# Patient Record
Sex: FEMALE | Race: BLACK OR AFRICAN AMERICAN
[De-identification: names, ages, dates, MRNs, and addresses within clinical notes are randomized per-mention and may not be internally consistent; named-entity substitution may affect disease eponyms.]

---

## 2018-03-22 ENCOUNTER — HOSPITAL ENCOUNTER (OUTPATIENT)
Dept: HOSPITAL 5 - OR | Age: 63
Discharge: HOME | End: 2018-03-22
Attending: OPHTHALMOLOGY
Payer: COMMERCIAL

## 2018-03-22 VITALS — DIASTOLIC BLOOD PRESSURE: 76 MMHG | SYSTOLIC BLOOD PRESSURE: 130 MMHG

## 2018-03-22 DIAGNOSIS — H18.51: ICD-10-CM

## 2018-03-22 DIAGNOSIS — H59.011: Primary | ICD-10-CM

## 2018-03-22 DIAGNOSIS — H26.9: ICD-10-CM

## 2018-03-22 PROCEDURE — V2632 POST CHMBR INTRAOCULAR LENS: HCPCS

## 2018-03-22 PROCEDURE — V2785 CORNEAL TISSUE PROCESSING: HCPCS

## 2018-03-22 PROCEDURE — 87075 CULTR BACTERIA EXCEPT BLOOD: CPT

## 2018-03-22 PROCEDURE — 87116 MYCOBACTERIA CULTURE: CPT

## 2018-03-22 PROCEDURE — 66982 XCAPSL CTRC RMVL CPLX WO ECP: CPT

## 2018-03-22 PROCEDURE — 65730 CORNEAL TRANSPLANT: CPT

## 2018-03-22 RX ADMIN — TROPICAMIDE SCH DROPS: 10 SOLUTION/ DROPS OPHTHALMIC at 11:27

## 2018-03-22 RX ADMIN — MOXIFLOXACIN SCH DROPS: 5 SOLUTION/ DROPS OPHTHALMIC at 11:27

## 2018-03-22 RX ADMIN — MOXIFLOXACIN SCH DROPS: 5 SOLUTION/ DROPS OPHTHALMIC at 11:09

## 2018-03-22 RX ADMIN — TROPICAMIDE SCH DROPS: 10 SOLUTION/ DROPS OPHTHALMIC at 11:14

## 2018-03-22 RX ADMIN — PHENYLEPHRINE HYDROCHLORIDE SCH DROPS: 25 SOLUTION/ DROPS OPHTHALMIC at 11:27

## 2018-03-22 RX ADMIN — PHENYLEPHRINE HYDROCHLORIDE SCH DROPS: 25 SOLUTION/ DROPS OPHTHALMIC at 11:14

## 2018-03-22 RX ADMIN — PHENYLEPHRINE HYDROCHLORIDE SCH DROPS: 25 SOLUTION/ DROPS OPHTHALMIC at 11:09

## 2018-03-22 RX ADMIN — MOXIFLOXACIN SCH DROPS: 5 SOLUTION/ DROPS OPHTHALMIC at 11:14

## 2018-03-22 RX ADMIN — TROPICAMIDE SCH DROPS: 10 SOLUTION/ DROPS OPHTHALMIC at 11:09

## 2018-03-22 NOTE — ANESTHESIA CONSULTATION
Anesthesia Consult and Med Hx


Date of service: 03/22/18





- Airway


Anesthetic Teeth Evaluation: Poor


ROM Head & Neck: Adequate


Mental/Hyoid Distance: Adequate


Mallampati Class: Class II


Intubation Access Assessment: Probably Good





- Pulmonary Exam


CTA: Yes





- Cardiac Exam


Cardiac Exam: RRR





- Pre-Operative Health Status


ASA Pre-Surgery Classification: ASA2


Proposed Anesthetic Plan: General, MAC





- Central Nervous System


Hx Psychiatric Problems: No





- Other Systems


Hx Alcohol Use: No


Hx Substance Use: No


Hx Cancer: No

## 2018-03-22 NOTE — OPERATIVE REPORT
Operative Report


Operative Report: 





   














PATIENT'S NAME:   





DATE OF BIRTH:   





DATE OF SURGERY: 03/22/2018   





PREOPERATIVE DIAGNOSIS:   1-Fuchs dystrophy right eye 2- Cataract right eye  3-

bullous keratopathy right eye





   


POSTOPERATIVE DIAGNOSIS:    Same 





OPERATIVE PROCEDURE:   1-superficial keratectomy right eye 2- 

Phacoemulsification with intraocular lens implantation, right eye 3-posterior 

endothelial dystrophy right eye





SURGEON:   Radha Chisholm M.D.





ASSISTANT SURGEON: None   





Lens:  AO60 24.0 D


 


ANESTHESIA:   Monitored anesthesia care in combination with topical and 

intracameral anesthesia because of the established specific risk of reflux, 

arrhythmias, or anxiety attacks associated with ocular manipulation, as well as 

the difficulty of the ophthalmologist to manage such potentially catastrophic 

events while simultaneously attempting to complete the surgical procedure and 

was deemed necessary for the patient's safety to have an Anesthetist present 

during the procedure whenever possible. An Anesthetist was utilized to regulate 

the intravenous sedation of the patient so the patient was cooperative yet not 

asleep in order for the patient to successfully maintain fixation of the eye on 

the operating light of the microscope.





COMPLICATIONS:     No blood loss.





ALLERGIES:   [No known drug allergies]





PROGNOSIS:  Excellent





INDICATIONS FOR SURGERY:  The patient is undergoing surgery in the hopes of 

eliminating or improving these visual difficulties.





PROCEDURE:    After arriving at the surgery center, the patient was given 

topical anesthetic and dilating drops, as noted in the record. The patient was 

then taken into the operating room and given more anesthetic drops.  The eyelids

, lashes, and lid margins were scrubbed with Betadine solution, and the patient 

was draped.  The Nurse Anesthetist administered IV sedation and monitored the 

patient during the procedure.





The corneal surface was found to be very edematous hazy.  The swollen 

epithelial cells were removed using a Weck-Zara.  Used to the anterior chamber 

cleared after removal of the epithelial cells but a sub Prasda's membrane haze 

remained. 


The eye was then fixated with a 0.12, and a stab incision was made in the 

peripheral clear cornea into the anterior chamber. This was made on my left 

side.  Because of the poor view trypan blue was injected .  Viscoelastic was 

next used to fill the anterior chamber.  The eye was once again fixated with 

the 0.12 forceps and a keratome was used make an incision in clear cornea 

peripherally on my right hand side temporally.  





The capsule forceps were used to open the central anterior capsule and then 

make a continuous round capsulotomy.


Hydrodissection was carried out utilizing a cannula and balanced salt solution 

to delineate the cortical material from the capsule and the nucleus from the 

cortical material.


The phaco tip was introduced into the eye and used to remove the anterior 

cortical material in the area of the capsulotomy.  Then the phaco tip was 

buried into the nucleus, and a chopping instrument was introduced into the eye 

and used to provide countertraction in the nucleus between this instrument and 

the phaco tip fracturing the nucleus.  This procedure was repeated multiple 

times, providing multiple small segments of the lens, and then the phaco tip 

was used to remove each of these segments.


An I/A tip was then used to remove the remaining cortex.  


The anterior chamber was refilled with viscoelastic.  An one-piece, acrylic 

intraocular lens was then placed into an inserting cartridge.  The tip of the 

inserting cartridge was introduced into the keratome incision and into the 

anterior chamber.  The implant was gently advanced through the cartridge and 

into the eye, where it unfolded, and both haptics were placed in the capsular 

bag, where it centered nicely and appeared to be well fixated.  


 





The patient has decompensation of the cornea with dysfunction and dystrophy of 

the corneal endothelium. Donor tissue will be used to replace the dysfunctional 

endothelium utilizing a penetrating technique into the anterior chamber. This 

complicated technique allows the patient to maintain structural integrity of 

the eye making it much more resistant to traumatic rupture than a standard 

keratoplasty. It also allows a larger than average surface area of transplanted 

endothelium which hopefully in the long run will allow for longer endothelial 

viability and success for the surgery.  It also provides for faster visual 

recovery and less anisometropia than previous keratoplasty techniques. It is 

technically more difficult because of the complicated preparation of the donor 

tissue and insertion of the donor tissue into the anterior chamber with 

fixation of the tissue without direct suturing of the donor. This also provides 

an increased chance of dislocation of the donor tissue in the immediate 

postoperative period compared to standard keratoplasty techniques.  This type 

of corneal transplant takes over twice as long as a standard corneal 

transplant.  Moreover, the preparation and end of surgery are more time 

consuming for both the surgeon and staff: the blocking of the eye requires a 

longer setup time as noted, the procedure itself is more complex, and the 

patient needs to lay face up and flat in the recovery area for a specified 

period of time prior to discharge.  Preoperatively, it was discussed with the 

patient that they could have either a standard corneal transplant or a 

variation of a penetrating keratoplasty where only the posterior layers of the 

cornea are transplanted.  With this new technique, as with any corneal 

transplant, there is always the possibility that the surgery may need to be 

repeated; or, in this case, also repeated with a standard corneal transplant if 

the visual result is not satisfactory. However, some of the advantages of this 

current technique are much more rapid visual recovery and a tectonically 

stronger eye after surgery.





PROGNOSIS:  guarded because of sub-epi haze








PREPARATION OF DONOR TISSUE:  Prior to surgery, the donor tissue was inspected 

and found to be adequate for the planned procedure. The involved eye bank 

provided the usual demographic information on the donor including age, cause of 

death, necessary screening and blood work. The results were negative regarding 

possible transmission of diseases from the donor. Our inspection of the donor 

tissue showed no gross abnormalities.


The patients donor cornea was prepared by the eye bank to separate the 

anterior and posterior portions of the cornea for the planned surgery.  This 

was done with a microkeratome and the anterior cap of tissue was placed back on 

the cornea and it was removed from the artificial anterior chamber and placed 

in the Optisol storage solution and sent to us for transplantation.  Gentian 

violet marks were placed on the epithelial, or anterior surface of the corneal 

cap side of the tissue for identification.  The cornea was inspected prior to 

surgery and found to be suitable and at the time of surgery was taken out of 

the storage solution and carefully centered on the cutting block with the 

centration based on the centration alexys that was made the eye bank on the 

external surface of the cornea-the stromal side. The donor tissue was removed 

from the tissue storage container by carefully removing it with forceps taking 

care not to touch the endothelial surface but only the scleral rim Suction was 

applied, the centration was rechecked to make sure it was perfectly centered.  

Once the size of the appropriate trephine was determined, the donor was punched 

with the trephine. The donor cornea was then covered with tissue storage 

solution and put aside for use later in the surgical procedure.





PREPARATION OF THE RECIPIENT CORNEA:  Attention was now directed to the patient

s eye.  My assistant had been applying topical anesthetic consisting of both 

drops and cellulose sponges soaked in 2% Xylocaine with epinephrine. The 

speculum had already been placed between the eyelids and the eye prepped and 

draped in my usual manner with Betadine solution prior to the starting the 

surgery.  The horizontal corneal diameter was measured and the overall 

condition of the eye was evaluated to determine which size of trephine would 

best provide for as large donor button as possible without compromising the 

angle structures or iris.  Once the trephine size was chosen, it was used 

lightly to alexys the epithelium on the corneal surface to provide a reference 

for later placement of the donor tissue and for removal of Descemets membrane.

  This was the same trephine used to punch the donor tissue.


   


A 23-gauge needle hooked up to an infusion of balanced salt solution was 

inserted through the midperipheral cornea into the anterior chamber.  This was 

used to maintain the anterior chamber while removing Descemets membrane. A 

modified Price-Matthew hook was next placed through the stab incision and the 

hook was used to score Descemets membrane in a circular fashion, essentially 

scoring a [place] about 1.0 mm smaller than the previously placed reference alexys 

by the trephine on the epithelium.  A specially designed Descemets stripper/

scraper was inserted into the eye and carefully extended across the anterior 

chamber in a manner not to empty the anterior chamber, to damage the iris, or 

to damage the lens.  Starting at the nasal side of the anterior chamber, the 

stripper was used to catch Descemets membrane at the site of the scoring and 

the membrane was carefully stripped off the back surface of the cornea.  The 

stripper was first pulled back centrally to loosen Descemets membrane there 

and then to each side to help try and remove the central area of Descemets 

membrane which had been outlined by the scoring.  The membrane was then pulled 

out of the eye through the incision. The removed Descemets membrane was placed 

on the outer surface of the cornea and carefully unfolded to make sure it was 

removed in its entirety and that no portion was left in the central cornea.  

Any indication of retained central membrane was followed by reinsertion of the 

stripper and further removal of retained membrane.  Care was taken during both 

the scoring and stripping not to unduly push up into the overlying cornea as 

that can cause tearing of the corneal tissue, producing an irregular surface 

which can lead to decreased visual recovery and difficulty with adherence of 

the donor tissue to the recipient.  Once it was confirmed that Descemets 

membrane was removed, the incision was extended with the sharp blade for its 

full length into the anterior chamber.  The I~and~A handpiece was placed back 

into the eye and used to remove the viscoelastic, including viscoelastic that 

was behind the optic of the intraocular lens. The anterior chamber was 

reinflated with balanced salt solution.





Funnel:  The donor tissue was removed from the cutting block and transferred to 

the operative field.  It was placed on the patients cornea so that the 

endothelium of the donor was facing up.  The tissue storage solution and any 

blood or other material was allowed to flow off the donor tissue.  A small 

amount of viscoelastic was then placed on the endothelial surface of the donor.

  Two forceps were then used to grasp an edge of the donor taking care not to 

actually touch the endothelium and the posterior portion of the donor, which 

had been previously dissected on the artificial anterior chamber, and the donor 

gently partially pulled apart  about half of the posterior portion 

from the anterior portion.  A Busin Funnel was brought into the operative field 

and the posterior portion of the donor was pulled onto it.  Intraocular forceps 

were used to pull the tissue into the funnel, essentially making it fold over 

on itself endothelial side inward. The intraocular forceps were then introduced 

through a previously made peripheral corneal stab incision in the nasal cornea.

  The forceps were moved across the anterior chamber to the area of the 5.0 mm 

incision.  The Busin funnel was inserted into the 5.0 mm wound.  The forceps 

grasped the edge of the donor tissue and the tissue was pulled into the eye.  

Once inside the eye, the forceps released their hold and the funnel was 

removed.  Using a  30-gauge needle, a small stab incision was made in the 

peripheral cornea and a small amount of air was injected within the folded-over 

piece of donor tissue in the eye.  The air was slowly injected to unfold the 

donor endothelial side down.  Once the donor had unfolded, the anterior chamber 

was completely filled with air.    Once the donor was in the correct position 

and no retained fluid or air was present between the donor and recipient, the 

timer was started and the anterior chamber was left completely filled with air 

for the designated time.


 


OTHER SPECIFICS OF THE SURGICAL PROCEDURE:





Trephine size:  7.75 mm


 


Horizontal corneal diameter:  9.75 mm





Time anterior chamber was completely filled with air in the operating room 

while the donor cornea was allowed to hold in position without any manipulation 

or massaging:  eight minutes





The patient laid face up and flat in the recovery room with a partial air 

bubble to help with further adherence of the donor cornea to the recipient 

cornea for the following length of time:  30 minutes











SUBCONJUNCTIVAL INJECTIONS: Gentamycin 4mg





MEDICATIONS APPLIED AT END OF SURGERY:  8 of decadron iv and 1 gm of ancef








The patient was given a shield to wear at night and was instructed not to rub 

or push on the eye.





DISCHARGE SUMMARY:  The patient was released in stable condition.  The patient 

and those with the patient were given a written sheet of postoperative 

instructions and counseling on any abnormal laboratory studies.  The patient is 

to see us tomorrow for follow-up in the office and is to call immediately for 

any difficulties.  











_____________________________          ________________________________


Radha Chisholm M.D.                            Date

## 2018-03-22 NOTE — SHORT STAY SUMMARY
Short Stay Documentation


Date of service: 03/22/18





- History


H&P: obtained from office





- Allergies and Medications


Current Medications: 


 Allergies





No Known Allergies Allergy (Verified 03/21/18 16:11)


 





 Home Medications











 Medication  Instructions  Recorded  Confirmed  Last Taken  Type


 


No Known Home Medications [No  03/21/18 03/21/18 Unknown History





Reported Home Medications]     








Active Medications





Moxifloxacin HCl (Vigamox)  1 drops OD Q5MIN TIFFANIE


   Stop: 03/22/18 23:59


   Last Admin: 03/22/18 11:27 Dose:  1 drops


Phenylephrine HCl (Ak-Dilate)  1 drops OD Q5MIN TIFFANIE


   Stop: 03/22/18 23:59


   Last Admin: 03/22/18 11:27 Dose:  1 drops


Prednisolone Acetate (Pred Forte 1%)  1 drops OD QID TIFFANIE


Tetracaine HCl (Tetracaine 0.5%)  1 drops OD Q5M PRN


   PRN Reason: Pain , Severe (7-10)


   Stop: 03/22/18 23:59


   Last Admin: 03/22/18 11:06 Dose:  1 drops


Tropicamide (Mydriacyl)  1 drops OD Q5MIN TIFFANIE


   Stop: 03/22/18 23:59


   Last Admin: 03/22/18 11:27 Dose:  1 drops











- Brief post op/procedure progress note


Date of procedure: 03/22/18


Pre-op diagnosis: Fuchs dystrophy cataract and bullous keratopathy right eye


Post-op diagnosis: same


Procedure: 





Superficial keratectomy phacoemulsification with intraocular lens insertion and 

corneal transplant right eye


Anesthesia: MAC, local


Surgeon: TAMEKA THOMPSON


Estimated blood loss: none


Pathology: list (donor cornea)


Specimen disposition: to lab


Condition: stable





- Disposition


Condition at discharge: Good


Disposition: [id number] TO HOME OR SELFCARE





- Discharge Diagnoses


(1) Fuchs' corneal dystrophy


Status: Resolved   





(2) Cataract


Status: Acute   


Qualifiers: 


   Cataract type: age-related   Age-related cataract type: nuclear   Laterality

: right   Qualified Code(s): H25.11 - Age-related nuclear cataract, right eye   





(3) Bullous keratopathy of right eye


Status: Resolved   





Short Stay Discharge Plan


Additional Instructions: 


FOLLOW SURGEON INSTRUCTION SHEETS.


Follow up with: 


ELKE WILDER MD [Primary Care Provider] - 7 Days


Forms:  [facility]